# Patient Record
Sex: FEMALE | HISPANIC OR LATINO
[De-identification: names, ages, dates, MRNs, and addresses within clinical notes are randomized per-mention and may not be internally consistent; named-entity substitution may affect disease eponyms.]

---

## 2022-07-05 ENCOUNTER — APPOINTMENT (OUTPATIENT)
Dept: RHEUMATOLOGY | Facility: CLINIC | Age: 67
End: 2022-07-05

## 2022-07-11 ENCOUNTER — APPOINTMENT (OUTPATIENT)
Dept: RHEUMATOLOGY | Facility: CLINIC | Age: 67
End: 2022-07-11

## 2022-07-11 ENCOUNTER — LABORATORY RESULT (OUTPATIENT)
Age: 67
End: 2022-07-11

## 2022-07-11 VITALS
HEIGHT: 60 IN | SYSTOLIC BLOOD PRESSURE: 142 MMHG | BODY MASS INDEX: 35.34 KG/M2 | TEMPERATURE: 97.9 F | OXYGEN SATURATION: 95 % | DIASTOLIC BLOOD PRESSURE: 76 MMHG | HEART RATE: 84 BPM | WEIGHT: 180 LBS

## 2022-07-11 DIAGNOSIS — I10 ESSENTIAL (PRIMARY) HYPERTENSION: ICD-10-CM

## 2022-07-11 DIAGNOSIS — I25.10 ATHEROSCLEROTIC HEART DISEASE OF NATIVE CORONARY ARTERY W/OUT ANGINA PECTORIS: ICD-10-CM

## 2022-07-11 DIAGNOSIS — Z87.891 PERSONAL HISTORY OF NICOTINE DEPENDENCE: ICD-10-CM

## 2022-07-11 DIAGNOSIS — Z00.00 ENCOUNTER FOR GENERAL ADULT MEDICAL EXAMINATION W/OUT ABNORMAL FINDINGS: ICD-10-CM

## 2022-07-11 DIAGNOSIS — J45.909 UNSPECIFIED ASTHMA, UNCOMPLICATED: ICD-10-CM

## 2022-07-11 DIAGNOSIS — Z84.0 FAMILY HISTORY OF DISEASES OF THE SKIN AND SUBCUTANEOUS TISSUE: ICD-10-CM

## 2022-07-11 DIAGNOSIS — Z11.59 ENCOUNTER FOR SCREENING FOR OTHER VIRAL DISEASES: ICD-10-CM

## 2022-07-11 DIAGNOSIS — R79.89 OTHER SPECIFIED ABNORMAL FINDINGS OF BLOOD CHEMISTRY: ICD-10-CM

## 2022-07-11 DIAGNOSIS — E78.5 HYPERLIPIDEMIA, UNSPECIFIED: ICD-10-CM

## 2022-07-11 DIAGNOSIS — Z83.79 FAMILY HISTORY OF OTHER DISEASES OF THE DIGESTIVE SYSTEM: ICD-10-CM

## 2022-07-11 DIAGNOSIS — T78.40XA ALLERGY, UNSPECIFIED, INITIAL ENCOUNTER: ICD-10-CM

## 2022-07-11 DIAGNOSIS — Z82.49 FAMILY HISTORY OF ISCHEMIC HEART DISEASE AND OTHER DISEASES OF THE CIRCULATORY SYSTEM: ICD-10-CM

## 2022-07-11 DIAGNOSIS — Z80.8 FAMILY HISTORY OF MALIGNANT NEOPLASM OF OTHER ORGANS OR SYSTEMS: ICD-10-CM

## 2022-07-11 PROCEDURE — 99204 OFFICE O/P NEW MOD 45 MIN: CPT

## 2022-07-11 RX ORDER — HYDROCHLOROTHIAZIDE 12.5 MG/1
12.5 TABLET ORAL
Refills: 0 | Status: ACTIVE | COMMUNITY
Start: 2022-07-11

## 2022-07-11 RX ORDER — SULFASALAZINE 500 MG/1
500 TABLET ORAL
Refills: 0 | Status: ACTIVE | COMMUNITY
Start: 2022-07-11

## 2022-07-11 RX ORDER — ATORVASTATIN CALCIUM 40 MG/1
40 TABLET, FILM COATED ORAL
Refills: 0 | Status: ACTIVE | COMMUNITY
Start: 2022-07-11

## 2022-07-11 RX ORDER — LOSARTAN POTASSIUM 25 MG/1
25 TABLET, FILM COATED ORAL
Refills: 0 | Status: ACTIVE | COMMUNITY
Start: 2022-07-11

## 2022-07-11 RX ORDER — BISOPROLOL FUMARATE 10 MG/1
10 TABLET, FILM COATED ORAL
Refills: 0 | Status: ACTIVE | COMMUNITY
Start: 2022-07-11

## 2022-07-11 NOTE — PHYSICAL EXAM
[General Appearance - Well Nourished] : well nourished [General Appearance - Well Developed] : well developed [Sclera] : the sclera and conjunctiva were normal [Auscultation Breath Sounds / Voice Sounds] : lungs were clear to auscultation bilaterally [Heart Sounds] : normal S1 and S2 [Heart Sounds Gallop] : no gallops [Murmurs] : no murmurs [Heart Sounds Pericardial Friction Rub] : no pericardial rub [Abdomen Soft] : soft [Abdomen Tenderness] : non-tender [] : no hepato-splenomegaly [Musculoskeletal - Swelling] : no joint swelling seen [FreeTextEntry1] : psoriatic rash 1 cm- with thick plaque, mod scaling, significant erythema  [Oriented To Time, Place, And Person] : oriented to person, place, and time

## 2022-07-11 NOTE — ASSESSMENT
[FreeTextEntry1] : 65 y/o F w PsO, w polyarthralgias\par =wrists, MCPs, PIPs, DIPs, knees, lower back\par =worse in am\par =stiffness\par =PsO on scalp \par =eosinophilic asthma\par =dx of UC \par =hx of OP, hx of fx; refuses tx \par \par ddx seronegative spondyloarthropathy like PsA or IBD arthritis, given hx of both PsO/UC vs OA vs RA? vs EGPA (very unlikely, even w hx of asthma) \par Will send for serologies and inflammatory markers\par WIll obtain Vitamin D. Will  about untreated OP. \par \par Plan\par -Labs w serologies, inflammatory markers\par -Xrays hands/wrists, lumbar spine, pelvis \par RTO in 2 weeks to discuss dx and treatment plan\par \par \par

## 2022-07-11 NOTE — HISTORY OF PRESENT ILLNESS
[FreeTextEntry1] : 67 y/o F here for initial visit\par \par Pt says she has "tons of inflammation".\par \par Reports pain in wrists, MCPs, PIPs, DIPs, knees. Hand pain worse in am. Reports stiffness, and swelling. \par Pain in lower back. No stiffness. \par \par Pt says her hands fall asleep. Has numbness in palm of hands. Started in 2020. \par \par Reports PsO in scalp. Pt was dx in [id number]. Pt was given topical medications for this. \par Pt was dx w UC in 2007. Denies any diarrhea, but has abdominal pain, cramps, and bleeding. Pt was given ssz, mesalamine. Pt has refused humira, remicaid. \par \par Pt on dupixent for eosinophilic asthma. \par \par Pt says she has OP. \par Reports easy fracture. However, pt says initially, she was told she has no fx. Reports fx in lower back, and R shin. Could not tolerate oral biphosphonate, and denies any IV or SQ medication. \par \par Pt says she has PsO, colitis, RA, OA, PsA, OP. \par \par No fevers, h/a, hair loss, oral ulcers, epistaxis, sinusitis,  swollen glands, dry mouth, , CP, SOB, cough, vision changes,  GERD, n/v/d, blood in urine, focal weakness, sensory loss,  Raynaud's, weight loss. \par +dry eyes\par +SOB, cough asthma and heart disease \par \par

## 2022-07-12 ENCOUNTER — NON-APPOINTMENT (OUTPATIENT)
Age: 67
End: 2022-07-12

## 2022-07-22 ENCOUNTER — APPOINTMENT (OUTPATIENT)
Dept: RHEUMATOLOGY | Facility: CLINIC | Age: 67
End: 2022-07-22

## 2022-07-22 ENCOUNTER — TRANSCRIPTION ENCOUNTER (OUTPATIENT)
Age: 67
End: 2022-07-22

## 2022-07-22 LAB
25(OH)D3 SERPL-MCNC: 32.1 NG/ML
ALBUMIN SERPL ELPH-MCNC: 4.2 G/DL
ALP BLD-CCNC: 136 U/L
ALT SERPL-CCNC: 16 U/L
ANA PAT FLD IF-IMP: NORMAL
ANA SER IF-ACNC: ABNORMAL
ANION GAP SERPL CALC-SCNC: 12 MMOL/L
APPEARANCE: CLEAR
AST SERPL-CCNC: 26 U/L
BACTERIA: NEGATIVE
BASOPHILS # BLD AUTO: 0.07 K/UL
BASOPHILS NFR BLD AUTO: 1.1 %
BILIRUB SERPL-MCNC: 0.2 MG/DL
BILIRUBIN URINE: NEGATIVE
BLOOD URINE: NEGATIVE
BUN SERPL-MCNC: 16 MG/DL
C3 SERPL-MCNC: 153 MG/DL
C4 SERPL-MCNC: 24 MG/DL
CALCIUM SERPL-MCNC: 9.4 MG/DL
CCP AB SER IA-ACNC: <8 UNITS
CHLORIDE SERPL-SCNC: 102 MMOL/L
CK SERPL-CCNC: 114 U/L
CO2 SERPL-SCNC: 25 MMOL/L
COLOR: YELLOW
CREAT SERPL-MCNC: 0.74 MG/DL
CREAT SPEC-SCNC: 96 MG/DL
CREAT/PROT UR: 0.2 RATIO
CRP SERPL-MCNC: 13 MG/L
DSDNA AB SER-ACNC: 31 IU/ML
EGFR: 90 ML/MIN/1.73M2
ENA RNP AB SER IA-ACNC: <0.2 AL
ENA SM AB SER IA-ACNC: 0.2 AL
ENA SS-A AB SER IA-ACNC: <0.2 AL
ENA SS-B AB SER IA-ACNC: <0.2 AL
EOSINOPHIL # BLD AUTO: 0.62 K/UL
EOSINOPHIL NFR BLD AUTO: 9.6 %
ERYTHROCYTE [SEDIMENTATION RATE] IN BLOOD BY WESTERGREN METHOD: 74 MM/HR
G6PD SER-CCNC: 13.7 U/G HGB
GLUCOSE QUALITATIVE U: NEGATIVE
GLUCOSE SERPL-MCNC: 125 MG/DL
HBV CORE IGG+IGM SER QL: NONREACTIVE
HBV SURFACE AB SER QL: REACTIVE
HBV SURFACE AG SER QL: NONREACTIVE
HCT VFR BLD CALC: 39.9 %
HCV AB SER QL: NONREACTIVE
HCV S/CO RATIO: 0.12 S/CO
HGB BLD-MCNC: 12.4 G/DL
HLA-B27 RELATED AG QL: NEGATIVE
HYALINE CASTS: 1 /LPF
IMM GRANULOCYTES NFR BLD AUTO: 0.2 %
KETONES URINE: NEGATIVE
LEUKOCYTE ESTERASE URINE: NEGATIVE
LYMPHOCYTES # BLD AUTO: 1.64 K/UL
LYMPHOCYTES NFR BLD AUTO: 25.3 %
M TB IFN-G BLD-IMP: NEGATIVE
MAN DIFF?: NORMAL
MCHC RBC-ENTMCNC: 28.8 PG
MCHC RBC-ENTMCNC: 31.1 GM/DL
MCV RBC AUTO: 92.8 FL
MICROSCOPIC-UA: NORMAL
MONOCYTES # BLD AUTO: 0.4 K/UL
MONOCYTES NFR BLD AUTO: 6.2 %
MYELOPEROXIDASE AB SER QL IA: 6 UNITS
MYELOPEROXIDASE CELLS FLD QL: NEGATIVE
NEUTROPHILS # BLD AUTO: 3.73 K/UL
NEUTROPHILS NFR BLD AUTO: 57.6 %
NITRITE URINE: NEGATIVE
PH URINE: 8
PLATELET # BLD AUTO: 311 K/UL
POTASSIUM SERPL-SCNC: 3.8 MMOL/L
PROT SERPL-MCNC: 7.6 G/DL
PROT UR-MCNC: 23 MG/DL
PROTEIN URINE: NORMAL
PROTEINASE3 AB SER IA-ACNC: <5 UNITS
PROTEINASE3 AB SER-ACNC: NEGATIVE
QUANTIFERON TB PLUS MITOGEN MINUS NIL: >10 IU/ML
QUANTIFERON TB PLUS NIL: 0.02 IU/ML
QUANTIFERON TB PLUS TB1 MINUS NIL: 0 IU/ML
QUANTIFERON TB PLUS TB2 MINUS NIL: 0 IU/ML
RBC # BLD: 4.3 M/UL
RBC # FLD: 16.5 %
RED BLOOD CELLS URINE: 2 /HPF
RF+CCP IGG SER-IMP: NEGATIVE
RHEUMATOID FACT SER QL: 14 IU/ML
SODIUM SERPL-SCNC: 139 MMOL/L
SPECIFIC GRAVITY URINE: 1.02
SQUAMOUS EPITHELIAL CELLS: 0 /HPF
UROBILINOGEN URINE: NORMAL
WBC # FLD AUTO: 6.47 K/UL
WHITE BLOOD CELLS URINE: 0 /HPF

## 2022-07-22 PROCEDURE — 99442: CPT

## 2022-07-26 ENCOUNTER — NON-APPOINTMENT (OUTPATIENT)
Age: 67
End: 2022-07-26

## 2022-07-29 ENCOUNTER — NON-APPOINTMENT (OUTPATIENT)
Age: 67
End: 2022-07-29

## 2022-08-12 ENCOUNTER — NON-APPOINTMENT (OUTPATIENT)
Age: 67
End: 2022-08-12

## 2022-08-12 RX ORDER — BICTEGRAVIR SODIUM, EMTRICITABINE, AND TENOFOVIR ALAFENAMIDE FUMARATE 50; 200; 25 MG/1; MG/1; MG/1
50-200-25 TABLET ORAL
Refills: 0 | Status: ACTIVE | COMMUNITY
Start: 2022-08-12

## 2022-08-19 ENCOUNTER — NON-APPOINTMENT (OUTPATIENT)
Age: 67
End: 2022-08-19

## 2022-08-22 ENCOUNTER — NON-APPOINTMENT (OUTPATIENT)
Age: 67
End: 2022-08-22

## 2022-08-23 ENCOUNTER — NON-APPOINTMENT (OUTPATIENT)
Age: 67
End: 2022-08-23

## 2022-09-14 ENCOUNTER — APPOINTMENT (OUTPATIENT)
Dept: RHEUMATOLOGY | Facility: CLINIC | Age: 67
End: 2022-09-14

## 2022-09-14 VITALS
BODY MASS INDEX: 35.34 KG/M2 | OXYGEN SATURATION: 98 % | HEIGHT: 60 IN | TEMPERATURE: 98 F | SYSTOLIC BLOOD PRESSURE: 121 MMHG | HEART RATE: 68 BPM | DIASTOLIC BLOOD PRESSURE: 70 MMHG | WEIGHT: 180 LBS

## 2022-09-14 DIAGNOSIS — M32.9 SYSTEMIC LUPUS ERYTHEMATOSUS, UNSPECIFIED: ICD-10-CM

## 2022-09-14 DIAGNOSIS — M54.50 LOW BACK PAIN, UNSPECIFIED: ICD-10-CM

## 2022-09-14 PROCEDURE — 99214 OFFICE O/P EST MOD 30 MIN: CPT

## 2022-09-14 NOTE — PHYSICAL EXAM
[General Appearance - Well Nourished] : well nourished [General Appearance - Well Developed] : well developed [Sclera] : the sclera and conjunctiva were normal [Auscultation Breath Sounds / Voice Sounds] : lungs were clear to auscultation bilaterally [Heart Sounds] : normal S1 and S2 [Heart Sounds Gallop] : no gallops [Murmurs] : no murmurs [Heart Sounds Pericardial Friction Rub] : no pericardial rub [Abdomen Soft] : soft [Abdomen Tenderness] : non-tender [] : no hepato-splenomegaly [Musculoskeletal - Swelling] : no joint swelling seen [Oriented To Time, Place, And Person] : oriented to person, place, and time [FreeTextEntry1] : psoriatic rash 1 cm- with thick plaque, mod scaling, significant erythema

## 2022-09-14 NOTE — HISTORY OF PRESENT ILLNESS
[___ Month(s) Ago] : [unfilled] month(s) ago [FreeTextEntry1] : =pt says that she stopped hydroxychloroquine, as pt getting h/a and diarrhea\par =pt w no diarrhea, but goes frequent and are soft; pt says sometimes is bloody\par =no fevers, SOB, CP, abdominal pain, n/v/d, rashes

## 2022-09-14 NOTE — ASSESSMENT
[FreeTextEntry1] : 67 y/o F w UC, PSO, seronegative spondyloarthropathy vs OA, lumbar spine DDD\par =diarreah, hematochezia, and colonscopy colon bx consistent w UC \par =wrists, MCPs, PIPs, DIPs, knees, lower back\par =worse in am w stiffness \par =PsO on scalp \par =labs 7/22 w high ESR, CRP, high TOÑO but DFS70 pattern, high ANCA, borderline RF, DsDNA \par =Xrays 7/22 w hands OA, and lumbar DDD\par =hx of OP, hx of fx; refuses tx \par \par Pt with several issues. Unsure if arthropathy is from OA vs inflammatory seronegative spondyloarthropathy? Combination of both? Pt has uncontrolled UC nad offered aza to control colitis and inflammatory arthropathy (possibly), but pt refused. Pt concerned about cancer risk. Will explain to patient that uncontrolled UC can lead to higher colon cancer risk.  Counseled on azathioprine, including immunosuppression, myelosuppression, elevation in LFTs, pancreatitis, GI upset. Pt will do TPMT test in case she changes her mind. \par \par I do not think TNF inhibitors are good option for patient, given borderline DsDNA. \par \par Will use gabapentin for degenerative component of pain. Counseled on possible side effects of gabapentin, including sedation, ataxia, constipation. \par \par WIll need to address OP as well. \par \par Plan\par -Labs w serologies, inflammatory markers, TPMT test \par -offer aza again and explain UC uncontrolled is cancer risk \par - of OP when call back for labs \par RTO depending if pt starts aza \par \par \par

## 2022-09-14 NOTE — DATA REVIEWED
[FreeTextEntry1] : labs reviewed from 7/22\par TOÑO 1:2560, p-ANCA 1:1280,  DFS70 pattern, RF 12, DsDNA 30s, alk phos 136\par ESR 74, CRP 13\par eosinophils 0.62\par HLA B27 negative \par \par Xrays 7/22 reviewed\par OA of first CMC, and 1st MCP, distal IP joints \par \par colonoscopy done 7/22 reviewed w mod colitis found in sigmoid colon  \par \par bx reviewed: transverse, cecum: colonic mucosa w prominent lymphoid aggregates; sigmoid w fragments of colonic mucosa w crypt architectural distortion and mild acute cryptitis

## 2022-09-16 RX ORDER — LIDOCAINE 40 MG/G
4 PATCH TOPICAL
Qty: 1 | Refills: 9 | Status: ACTIVE | COMMUNITY
Start: 2022-09-16 | End: 1900-01-01

## 2022-09-23 LAB
ALBUMIN SERPL ELPH-MCNC: 4.5 G/DL
ALP BLD-CCNC: 133 U/L
ALT SERPL-CCNC: 14 U/L
ANION GAP SERPL CALC-SCNC: 11 MMOL/L
AST SERPL-CCNC: 20 U/L
BASOPHILS # BLD AUTO: 0.06 K/UL
BASOPHILS NFR BLD AUTO: 0.9 %
BILIRUB SERPL-MCNC: <0.2 MG/DL
BUN SERPL-MCNC: 18 MG/DL
CALCIUM SERPL-MCNC: 9.8 MG/DL
CHLORIDE SERPL-SCNC: 99 MMOL/L
CK SERPL-CCNC: 90 U/L
CO2 SERPL-SCNC: 29 MMOL/L
CREAT SERPL-MCNC: 0.82 MG/DL
CRP SERPL-MCNC: 7 MG/L
DSDNA AB SER-ACNC: <12 IU/ML
EGFR: 79 ML/MIN/1.73M2
EOSINOPHIL # BLD AUTO: 0.47 K/UL
EOSINOPHIL NFR BLD AUTO: 6.9 %
ERYTHROCYTE [SEDIMENTATION RATE] IN BLOOD BY WESTERGREN METHOD: 76 MM/HR
GLUCOSE SERPL-MCNC: 87 MG/DL
HCT VFR BLD CALC: 41.3 %
HGB BLD-MCNC: 12.6 G/DL
IMM GRANULOCYTES NFR BLD AUTO: 0.1 %
LYMPHOCYTES # BLD AUTO: 1.84 K/UL
LYMPHOCYTES NFR BLD AUTO: 26.8 %
MAN DIFF?: NORMAL
MCHC RBC-ENTMCNC: 27.9 PG
MCHC RBC-ENTMCNC: 30.5 GM/DL
MCV RBC AUTO: 91.4 FL
MONOCYTES # BLD AUTO: 0.58 K/UL
MONOCYTES NFR BLD AUTO: 8.5 %
NEUTROPHILS # BLD AUTO: 3.9 K/UL
NEUTROPHILS NFR BLD AUTO: 56.8 %
PLATELET # BLD AUTO: 334 K/UL
POTASSIUM SERPL-SCNC: 4.5 MMOL/L
PROT SERPL-MCNC: 7.8 G/DL
RBC # BLD: 4.52 M/UL
RBC # FLD: 15.5 %
SODIUM SERPL-SCNC: 139 MMOL/L
TPMT ENZYME INTERPRETATION: NORMAL
TPMT ENZYME METHODOLOGY: NORMAL
TPMT ENZYME: 15.6
WBC # FLD AUTO: 6.86 K/UL

## 2022-09-30 LAB — HLA-B27 RELATED AG QL: NEGATIVE

## 2023-03-30 ENCOUNTER — APPOINTMENT (OUTPATIENT)
Dept: RHEUMATOLOGY | Facility: CLINIC | Age: 68
End: 2023-03-30
Payer: MEDICARE

## 2023-03-30 VITALS
WEIGHT: 180 LBS | OXYGEN SATURATION: 98 % | BODY MASS INDEX: 35.34 KG/M2 | DIASTOLIC BLOOD PRESSURE: 77 MMHG | TEMPERATURE: 98.1 F | SYSTOLIC BLOOD PRESSURE: 130 MMHG | HEIGHT: 60 IN | HEART RATE: 79 BPM | RESPIRATION RATE: 15 BRPM

## 2023-03-30 DIAGNOSIS — M51.9 UNSPECIFIED THORACIC, THORACOLUMBAR AND LUMBOSACRAL INTERVERTEBRAL DISC DISORDER: ICD-10-CM

## 2023-03-30 PROCEDURE — 99214 OFFICE O/P EST MOD 30 MIN: CPT

## 2023-03-30 NOTE — ASSESSMENT
[FreeTextEntry1] : 66 y/o F w UC, PSO, seronegative spondyloarthropathy vs OA, lumbar spine DDD\par =diarreah, hematochezia, and colonscopy colon bx consistent w UC \par =wrists, MCPs, PIPs, DIPs, knees, lower back\par =worse in am w stiffness \par =PsO on scalp \par =labs 7/22 w high ESR, CRP, high TOÑO but DFS70 pattern, high ANCA, borderline RF, DsDNA \par =Xrays 7/22 w hands OA, and lumbar DDD\par =hx of OP, hx of fx; refuses tx \par \par Pt still refuses escalating tx for IBD, PsO or OP. \par \par WIll refer to spine surgery for lumbar DDD. Pt refuses PT.\par \par Plan\par -spine surgery referral \par RTO PRN \par \par \par

## 2023-03-30 NOTE — HISTORY OF PRESENT ILLNESS
[___ Month(s) Ago] : [unfilled] month(s) ago [FreeTextEntry1] : =pt getting skin lumps, pt had biopsy on L forearm and will get results in 2 weeks\par =pt still having lower back pain; pt says that other joint pain is minimal\par =pt says joint pain is controlled w SSZ; pt says she still has diarrhea, and blood in stool.\par =no fevers, SOB, CP, abdominal pain, n/v/d, rashes

## 2023-04-21 ENCOUNTER — APPOINTMENT (OUTPATIENT)
Dept: ORTHOPEDIC SURGERY | Facility: CLINIC | Age: 68
End: 2023-04-21
Payer: MEDICARE

## 2023-04-21 VITALS
HEART RATE: 88 BPM | HEIGHT: 60 IN | SYSTOLIC BLOOD PRESSURE: 147 MMHG | BODY MASS INDEX: 35.34 KG/M2 | WEIGHT: 180 LBS | DIASTOLIC BLOOD PRESSURE: 75 MMHG

## 2023-04-21 DIAGNOSIS — E11.9 TYPE 2 DIABETES MELLITUS W/OUT COMPLICATIONS: ICD-10-CM

## 2023-04-21 DIAGNOSIS — M43.16 SPONDYLOLISTHESIS, LUMBAR REGION: ICD-10-CM

## 2023-04-21 DIAGNOSIS — J44.9 CHRONIC OBSTRUCTIVE PULMONARY DISEASE, UNSPECIFIED: ICD-10-CM

## 2023-04-21 DIAGNOSIS — M54.16 RADICULOPATHY, LUMBAR REGION: ICD-10-CM

## 2023-04-21 PROCEDURE — 99203 OFFICE O/P NEW LOW 30 MIN: CPT

## 2023-04-21 PROCEDURE — 72100 X-RAY EXAM L-S SPINE 2/3 VWS: CPT

## 2023-04-21 NOTE — HISTORY OF PRESENT ILLNESS
[de-identified] : Ms. TEN MARSHALL  is a 67 year old female who presents with 2 years of left lumbar pain that has gotten worse over time.  Denies any LE radicular symptoms.  Normal bowel and bladder control.   Denies any recent fevers, chills, sweats, weight loss, or infection.  She has used lidocaine patches which have helped but now insurance will not approve it.  She has colitis so she can not take nsaids. She can not bend forward. \par \par The patients past medical history, past surgical history, medications, allergies, and social history were reviewed by me today with the patient and documented accordingly.  In addition, the patient's family history, which is noncontributory to their visit, was also reviewed.\par

## 2023-04-21 NOTE — DISCUSSION/SUMMARY
[de-identified] : We reviewed her imaging.  We discussed further treatment options.  She is tried a variety of treatments over the years including medications, therapy, and injections.  She has had an MRI.  We discussed the role of surgery but she wishes to continue with injection based management.  She would like to be referred for this.  Referral was given.  Follow-up afterwards or sooner with any changes or worsening of her symptoms.

## 2023-04-21 NOTE — PHYSICAL EXAM
[de-identified] : Examination of the lumbar spine reveals no midline tenderness palpation, step-offs, or skin lesions. Decreased range of motion with respect to flexion, extension, lateral bending, and rotation. No tenderness to palpation of the sciatic notch. No tenderness palpation of the bilateral greater trochanters. No pain with passive internal/external rotation of the hips. No instability of bilateral lower extremities.  Negative ONIEL. Negative straight leg raise bilaterally. No bowstring. Negative femoral stretch. 5 out of 5 iliopsoas, hip abductors, hips adductors, quadriceps, hamstrings, gastrocsoleus, tibialis anterior, extensor hallucis longus, peroneals. Grossly intact sensation to light touch bilateral lower extremities. 1+ patellar and Achilles reflexes. Downgoing Babinski. No clonus. Intact proprioception. Palpable pulses. No skin lesion and no edema on the right and left lower extremities. [de-identified] : AP lateral lumbar x-rays reveals L4-5 spondylolisthesis.

## 2023-06-23 ENCOUNTER — APPOINTMENT (OUTPATIENT)
Dept: RHEUMATOLOGY | Facility: CLINIC | Age: 68
End: 2023-06-23
Payer: MEDICARE

## 2023-06-23 DIAGNOSIS — G89.29 CERVICALGIA: ICD-10-CM

## 2023-06-23 DIAGNOSIS — M25.50 PAIN IN UNSPECIFIED JOINT: ICD-10-CM

## 2023-06-23 DIAGNOSIS — M54.2 CERVICALGIA: ICD-10-CM

## 2023-06-23 PROCEDURE — 99442: CPT

## 2023-06-23 RX ORDER — GABAPENTIN 300 MG/1
300 CAPSULE ORAL
Qty: 90 | Refills: 1 | Status: ACTIVE | COMMUNITY
Start: 2022-09-14 | End: 1900-01-01

## 2023-07-27 ENCOUNTER — APPOINTMENT (OUTPATIENT)
Dept: RHEUMATOLOGY | Facility: CLINIC | Age: 68
End: 2023-07-27
Payer: MEDICARE

## 2023-07-27 VITALS
SYSTOLIC BLOOD PRESSURE: 143 MMHG | OXYGEN SATURATION: 97 % | BODY MASS INDEX: 35.34 KG/M2 | DIASTOLIC BLOOD PRESSURE: 72 MMHG | TEMPERATURE: 98 F | HEIGHT: 60 IN | HEART RATE: 76 BPM | WEIGHT: 180 LBS | RESPIRATION RATE: 16 BRPM

## 2023-07-27 DIAGNOSIS — R76.8 OTHER SPECIFIED ABNORMAL IMMUNOLOGICAL FINDINGS IN SERUM: ICD-10-CM

## 2023-07-27 DIAGNOSIS — Z86.19 PERSONAL HISTORY OF OTHER INFECTIOUS AND PARASITIC DISEASES: ICD-10-CM

## 2023-07-27 PROCEDURE — 99214 OFFICE O/P EST MOD 30 MIN: CPT

## 2023-07-27 RX ORDER — HYDROXYCHLOROQUINE SULFATE 200 MG/1
200 TABLET, FILM COATED ORAL
Qty: 60 | Refills: 2 | Status: DISCONTINUED | COMMUNITY
Start: 2022-07-22 | End: 2023-07-27

## 2023-07-27 NOTE — PHYSICAL EXAM
[General Appearance - Well Nourished] : well nourished [General Appearance - Well Developed] : well developed [Sclera] : the sclera and conjunctiva were normal [Auscultation Breath Sounds / Voice Sounds] : lungs were clear to auscultation bilaterally [Heart Sounds] : normal S1 and S2 [Heart Sounds Gallop] : no gallops [Murmurs] : no murmurs [Heart Sounds Pericardial Friction Rub] : no pericardial rub [Abdomen Soft] : soft [Abdomen Tenderness] : non-tender [Musculoskeletal - Swelling] : no joint swelling seen [] : no rash [Oriented To Time, Place, And Person] : oriented to person, place, and time

## 2023-07-27 NOTE — HISTORY OF PRESENT ILLNESS
[___ Month(s) Ago] : [unfilled] month(s) ago [FreeTextEntry1] : =pt having itching in feet; pt takes zyrtec, calamine lotion\par =pt says she has joint pain controlled w SSZ\par =pt still w diarrhea, and blood in stool \par =has not taken azathioprine \par =no fevers, SOB, CP, abdominal pain, n/v/d

## 2023-07-27 NOTE — ASSESSMENT
[FreeTextEntry1] : 66 y/o F w UC, PSO, seronegative spondyloarthropathy vs OA, lumbar spine DDD\par =diarrhea, hematochezia, and colonoscopy colon bx consistent w UC \par =wrists, MCPs, PIPs, DIPs, knees, lower back\par =worse in am w stiffness \par =PsO on scalp \par =labs 7/22 w high ESR, CRP, high TOÑO but DFS70 pattern, high ANCA, borderline RF, DsDNA \par =Xrays 7/22 w hands OA, and lumbar DDD\par =hx of OP, hx of fx; refuses tx \par \par Will start aza for IBD. Will have pt repeat CBC, CMP in 2 weeks. \par \par Unsure of cause of itching. Pt does not have TOÑO associated dz like SLE. TOÑO repeatedly in DFS70 pattern that is not typical associated w AI dz. Furthermore, unless renal involvement, AI dz do not typical present w isolated itching. \par \par Plan \par Labs to measure disease activity and monitor for drug toxicities \par aza 50mg daily\par c/w ssz 500mg BID\par RTO in 4-6 weeks\par \par

## 2023-08-05 LAB
ALBUMIN SERPL ELPH-MCNC: 4.2 G/DL
ALP BLD-CCNC: 127 U/L
ALT SERPL-CCNC: 19 U/L
ANION GAP SERPL CALC-SCNC: 11 MMOL/L
AST SERPL-CCNC: 25 U/L
BILIRUB SERPL-MCNC: 0.2 MG/DL
BUN SERPL-MCNC: 14 MG/DL
CALCIUM SERPL-MCNC: 9.5 MG/DL
CHLORIDE SERPL-SCNC: 100 MMOL/L
CO2 SERPL-SCNC: 28 MMOL/L
CREAT SERPL-MCNC: 0.67 MG/DL
CRP SERPL-MCNC: 12 MG/L
EGFR: 97 ML/MIN/1.73M2
ERYTHROCYTE [SEDIMENTATION RATE] IN BLOOD BY WESTERGREN METHOD: 45 MM/HR
GLUCOSE SERPL-MCNC: 101 MG/DL
HBV CORE IGG+IGM SER QL: NONREACTIVE
HBV SURFACE AB SER QL: REACTIVE
HBV SURFACE AG SER QL: NONREACTIVE
HCV AB SER QL: NONREACTIVE
HCV S/CO RATIO: 0.09 S/CO
M TB IFN-G BLD-IMP: NEGATIVE
POTASSIUM SERPL-SCNC: 3.7 MMOL/L
PROT SERPL-MCNC: 7.8 G/DL
QUANTIFERON TB PLUS MITOGEN MINUS NIL: 7.61 IU/ML
QUANTIFERON TB PLUS NIL: 0.02 IU/ML
QUANTIFERON TB PLUS TB1 MINUS NIL: 0 IU/ML
QUANTIFERON TB PLUS TB2 MINUS NIL: -0.01 IU/ML
SODIUM SERPL-SCNC: 138 MMOL/L

## 2023-08-09 ENCOUNTER — APPOINTMENT (OUTPATIENT)
Dept: RHEUMATOLOGY | Facility: CLINIC | Age: 68
End: 2023-08-09
Payer: MEDICARE

## 2023-08-09 VITALS
HEIGHT: 60 IN | WEIGHT: 180 LBS | HEART RATE: 76 BPM | BODY MASS INDEX: 35.34 KG/M2 | TEMPERATURE: 98 F | SYSTOLIC BLOOD PRESSURE: 127 MMHG | RESPIRATION RATE: 15 BRPM | OXYGEN SATURATION: 98 % | DIASTOLIC BLOOD PRESSURE: 74 MMHG

## 2023-08-09 PROCEDURE — 99214 OFFICE O/P EST MOD 30 MIN: CPT

## 2023-08-09 NOTE — HISTORY OF PRESENT ILLNESS
[___ Week(s) Ago] : [unfilled] week(s) ago [FreeTextEntry1] : =pt taking aza 50mg daily, ssz 500mg BID =pt says she feels better w azathioprine, less frequent bouts of bowel movement, but still has diarrhea =no fevers, SOB, CP, abdominal pain, n, rashes

## 2023-08-09 NOTE — ASSESSMENT
[FreeTextEntry1] : 66 y/o F w UC, PSO, seronegative spondyloarthropathy vs OA, lumbar spine DDD =diarrhea, hematochezia, and colonoscopy colon bx consistent w UC  =wrists, MCPs, PIPs, DIPs, knees, lower back =worse in am w stiffness  =PsO on scalp  =labs 7/22 w high ESR, CRP, high TOÑO but DFS70 pattern, high ANCA, borderline RF, DsDNA  =Xrays 7/22 w hands OA, and lumbar DDD =hx of OP, hx of fx; refuses tx   Seems azathioprine is helping already. Will obtain labs for monitoring.   Plan  Labs to measure disease activity and monitor for drug toxicities  aza 50mg daily c/w ssz 500mg BID RTO in 3 weeks

## 2023-08-18 LAB
ALBUMIN SERPL ELPH-MCNC: 4.4 G/DL
ALP BLD-CCNC: 124 U/L
ALT SERPL-CCNC: 17 U/L
ANION GAP SERPL CALC-SCNC: 10 MMOL/L
AST SERPL-CCNC: 26 U/L
BILIRUB SERPL-MCNC: <0.2 MG/DL
BUN SERPL-MCNC: 14 MG/DL
CALCIUM SERPL-MCNC: 9.7 MG/DL
CHLORIDE SERPL-SCNC: 102 MMOL/L
CO2 SERPL-SCNC: 27 MMOL/L
CREAT SERPL-MCNC: 0.68 MG/DL
CRP SERPL-MCNC: 8 MG/L
EGFR: 97 ML/MIN/1.73M2
ERYTHROCYTE [SEDIMENTATION RATE] IN BLOOD BY WESTERGREN METHOD: 44 MM/HR
GLUCOSE SERPL-MCNC: 95 MG/DL
POTASSIUM SERPL-SCNC: 4.1 MMOL/L
PROT SERPL-MCNC: 7.8 G/DL
SODIUM SERPL-SCNC: 139 MMOL/L

## 2023-09-01 ENCOUNTER — RX RENEWAL (OUTPATIENT)
Age: 68
End: 2023-09-01

## 2023-09-05 ENCOUNTER — APPOINTMENT (OUTPATIENT)
Dept: RHEUMATOLOGY | Facility: CLINIC | Age: 68
End: 2023-09-05
Payer: MEDICARE

## 2023-09-05 VITALS
SYSTOLIC BLOOD PRESSURE: 132 MMHG | HEART RATE: 80 BPM | HEIGHT: 60 IN | RESPIRATION RATE: 15 BRPM | DIASTOLIC BLOOD PRESSURE: 71 MMHG | BODY MASS INDEX: 35.34 KG/M2 | WEIGHT: 180 LBS | TEMPERATURE: 98.1 F | OXYGEN SATURATION: 97 %

## 2023-09-05 DIAGNOSIS — Z79.899 OTHER LONG TERM (CURRENT) DRUG THERAPY: ICD-10-CM

## 2023-09-05 DIAGNOSIS — R21 RASH AND OTHER NONSPECIFIC SKIN ERUPTION: ICD-10-CM

## 2023-09-05 PROCEDURE — 99214 OFFICE O/P EST MOD 30 MIN: CPT | Mod: GC

## 2023-09-05 NOTE — ASSESSMENT
[FreeTextEntry1] : 68 y/o F w UC, PSO, seronegative spondyloarthropathy vs OA, lumbar spine DDD =diarrhea, hematochezia, and colonoscopy colon bx consistent w UC  =wrists, MCPs, PIPs, DIPs, knees, lower back =worse in am w stiffness  =PsO on scalp  =labs 7/22 w high ESR, CRP, high TOÑO but DFS70 pattern, high ANCA, borderline RF, DsDNA  =Xrays 7/22 w hands OA, and lumbar DDD =hx of OP, hx of fx; refuses tx  =AZA helping patient's IBD joint pain stable =however patient experienced a ?photosensitivity rash with resolution  Azathioprine clinically helping the patient. Will obtain labs for monitoring.   Plan  Labs to measure disease activity and monitor for drug toxicities  aza 50mg daily c/w ssz 500mg BID if rash recurs will stop AZA. if stable will consider increase in the future  RTO in 2 months  case d/w Dr. Rothman

## 2023-09-05 NOTE — HISTORY OF PRESENT ILLNESS
[___ Week(s) Ago] : [unfilled] week(s) ago [FreeTextEntry1] : =pt taking aza 50mg daily, ssz 500mg BID =complains of pruritis with imuran as well as photosensitive rash =complains of back pain. otherwise denies other joint pain.  =IBD also improved with the imuran. needs only a quater of the imodium the patient used to chronically take. =pt says she feels better w azathioprine, less frequent bouts of bowel movement, but still has diarrhea =no fevers, SOB, CP, abdominal pain,

## 2023-09-05 NOTE — PHYSICAL EXAM
[General Appearance - Well Nourished] : well nourished [General Appearance - Well Developed] : well developed [Sclera] : the sclera and conjunctiva were normal [Auscultation Breath Sounds / Voice Sounds] : lungs were clear to auscultation bilaterally [Heart Sounds] : normal S1 and S2 [Heart Sounds Gallop] : no gallops [Murmurs] : no murmurs [Heart Sounds Pericardial Friction Rub] : no pericardial rub [Abdomen Soft] : soft [Abdomen Tenderness] : non-tender [] : no hepato-splenomegaly [Musculoskeletal - Swelling] : no joint swelling seen [Oriented To Time, Place, And Person] : oriented to person, place, and time [FreeTextEntry1] : Faint erythema noted on R lateral epicondyle. slightly raised

## 2023-09-07 ENCOUNTER — APPOINTMENT (OUTPATIENT)
Dept: RHEUMATOLOGY | Facility: CLINIC | Age: 68
End: 2023-09-07

## 2023-09-13 LAB
ALBUMIN SERPL ELPH-MCNC: 4.4 G/DL
ALP BLD-CCNC: 123 U/L
ALT SERPL-CCNC: 18 U/L
ANION GAP SERPL CALC-SCNC: 12 MMOL/L
AST SERPL-CCNC: 29 U/L
BASOPHILS # BLD AUTO: 0.1 K/UL
BASOPHILS NFR BLD AUTO: 1.4 %
BILIRUB SERPL-MCNC: 0.2 MG/DL
BUN SERPL-MCNC: 15 MG/DL
CALCIUM SERPL-MCNC: 9.4 MG/DL
CHLORIDE SERPL-SCNC: 100 MMOL/L
CO2 SERPL-SCNC: 27 MMOL/L
CREAT SERPL-MCNC: 0.64 MG/DL
CRP SERPL-MCNC: 12 MG/L
EGFR: 97 ML/MIN/1.73M2
EOSINOPHIL # BLD AUTO: 0.62 K/UL
EOSINOPHIL NFR BLD AUTO: 8.7 %
ERYTHROCYTE [SEDIMENTATION RATE] IN BLOOD BY WESTERGREN METHOD: 66 MM/HR
GLUCOSE SERPL-MCNC: 85 MG/DL
HCT VFR BLD CALC: 39.5 %
HGB BLD-MCNC: 12.2 G/DL
IMM GRANULOCYTES NFR BLD AUTO: 0.3 %
LYMPHOCYTES # BLD AUTO: 1.52 K/UL
LYMPHOCYTES NFR BLD AUTO: 21.3 %
MAN DIFF?: NORMAL
MCHC RBC-ENTMCNC: 29.8 PG
MCHC RBC-ENTMCNC: 30.9 GM/DL
MCV RBC AUTO: 96.3 FL
MONOCYTES # BLD AUTO: 0.62 K/UL
MONOCYTES NFR BLD AUTO: 8.7 %
NEUTROPHILS # BLD AUTO: 4.27 K/UL
NEUTROPHILS NFR BLD AUTO: 59.6 %
PLATELET # BLD AUTO: 321 K/UL
POTASSIUM SERPL-SCNC: 3.7 MMOL/L
PROT SERPL-MCNC: 7.6 G/DL
RBC # BLD: 4.1 M/UL
RBC # FLD: 16.8 %
SODIUM SERPL-SCNC: 139 MMOL/L
WBC # FLD AUTO: 7.15 K/UL

## 2023-10-06 ENCOUNTER — RX RENEWAL (OUTPATIENT)
Age: 68
End: 2023-10-06

## 2023-11-09 ENCOUNTER — APPOINTMENT (OUTPATIENT)
Dept: RHEUMATOLOGY | Facility: CLINIC | Age: 68
End: 2023-11-09
Payer: MEDICARE

## 2023-11-09 VITALS
SYSTOLIC BLOOD PRESSURE: 157 MMHG | WEIGHT: 180 LBS | BODY MASS INDEX: 35.34 KG/M2 | TEMPERATURE: 98.3 F | DIASTOLIC BLOOD PRESSURE: 75 MMHG | RESPIRATION RATE: 15 BRPM | OXYGEN SATURATION: 97 % | HEART RATE: 87 BPM | HEIGHT: 60 IN

## 2023-11-09 DIAGNOSIS — M19.90 UNSPECIFIED OSTEOARTHRITIS, UNSPECIFIED SITE: ICD-10-CM

## 2023-11-09 DIAGNOSIS — K51.90 ULCERATIVE COLITIS, UNSPECIFIED, W/OUT COMPLICATIONS: ICD-10-CM

## 2023-11-09 DIAGNOSIS — M47.819 SPONDYLOSIS W/OUT MYELOPATHY OR RADICULOPATHY, SITE UNSPECIFIED: ICD-10-CM

## 2023-11-09 DIAGNOSIS — L40.9 PSORIASIS, UNSPECIFIED: ICD-10-CM

## 2023-11-09 PROCEDURE — 99214 OFFICE O/P EST MOD 30 MIN: CPT

## 2023-11-09 RX ORDER — AZATHIOPRINE 50 1/1
50 TABLET ORAL DAILY
Qty: 30 | Refills: 1 | Status: DISCONTINUED | COMMUNITY
Start: 2023-06-23 | End: 2023-11-09

## 2024-02-06 ENCOUNTER — APPOINTMENT (OUTPATIENT)
Dept: RHEUMATOLOGY | Facility: CLINIC | Age: 69
End: 2024-02-06

## 2024-04-22 RX ORDER — AZATHIOPRINE 75 MG/1
75 TABLET ORAL DAILY
Qty: 90 | Refills: 0 | Status: ACTIVE | COMMUNITY
Start: 2023-11-09 | End: 1900-01-01